# Patient Record
Sex: MALE | URBAN - NONMETROPOLITAN AREA
[De-identification: names, ages, dates, MRNs, and addresses within clinical notes are randomized per-mention and may not be internally consistent; named-entity substitution may affect disease eponyms.]

---

## 2023-06-28 ENCOUNTER — TELEPHONE (OUTPATIENT)
Dept: FAMILY MEDICINE | Facility: CLINIC | Age: 43
End: 2023-06-28

## 2023-06-28 NOTE — TELEPHONE ENCOUNTER
This person is not a patient here and has never been seen by Dr. Mcintosh. Pt lives in [place]. I believe they have the wrong Dr.  I tried to call the number on file to relay this, but this is not a working number.